# Patient Record
Sex: FEMALE | Race: OTHER | Employment: FULL TIME | URBAN - METROPOLITAN AREA
[De-identification: names, ages, dates, MRNs, and addresses within clinical notes are randomized per-mention and may not be internally consistent; named-entity substitution may affect disease eponyms.]

---

## 2018-08-04 ENCOUNTER — OFFICE VISIT (OUTPATIENT)
Dept: FAMILY MEDICINE CLINIC | Facility: CLINIC | Age: 38
End: 2018-08-04
Payer: COMMERCIAL

## 2018-08-04 VITALS
TEMPERATURE: 97 F | HEART RATE: 80 BPM | DIASTOLIC BLOOD PRESSURE: 86 MMHG | SYSTOLIC BLOOD PRESSURE: 138 MMHG | BODY MASS INDEX: 48.52 KG/M2 | WEIGHT: 257 LBS | HEIGHT: 60.96 IN

## 2018-08-04 DIAGNOSIS — M54.50 ACUTE BILATERAL LOW BACK PAIN WITHOUT SCIATICA: ICD-10-CM

## 2018-08-04 DIAGNOSIS — J30.9 ALLERGIC RHINITIS, UNSPECIFIED SEASONALITY, UNSPECIFIED TRIGGER: ICD-10-CM

## 2018-08-04 DIAGNOSIS — G44.209 ACUTE NON INTRACTABLE TENSION-TYPE HEADACHE: Primary | ICD-10-CM

## 2018-08-04 LAB
APPEARANCE: CLEAR
BILIRUBIN: NEGATIVE
GLUCOSE (URINE DIPSTICK): NEGATIVE MG/DL
MULTISTIX LOT#: NORMAL NUMERIC
NITRITE, URINE: NEGATIVE
OCCULT BLOOD: NEGATIVE
PH, URINE: 5.5 (ref 4.5–8)
PROTEIN (URINE DIPSTICK): NEGATIVE MG/DL
SPECIFIC GRAVITY: >=1.03 (ref 1–1.03)
URINE-COLOR: YELLOW
UROBILINOGEN,SEMI-QN: 0.2 MG/DL (ref 0–1.9)

## 2018-08-04 PROCEDURE — 99204 OFFICE O/P NEW MOD 45 MIN: CPT | Performed by: FAMILY MEDICINE

## 2018-08-04 PROCEDURE — 99212 OFFICE O/P EST SF 10 MIN: CPT | Performed by: FAMILY MEDICINE

## 2018-08-04 PROCEDURE — 81003 URINALYSIS AUTO W/O SCOPE: CPT | Performed by: FAMILY MEDICINE

## 2018-08-04 RX ORDER — KETOROLAC TROMETHAMINE 10 MG/1
10 TABLET, FILM COATED ORAL EVERY 12 HOURS PRN
Qty: 10 TABLET | Refills: 1 | Status: SHIPPED | OUTPATIENT
Start: 2018-08-04 | End: 2018-11-08

## 2018-08-04 RX ORDER — FLUTICASONE PROPIONATE 50 MCG
2 SPRAY, SUSPENSION (ML) NASAL DAILY
Qty: 1 INHALER | Refills: 3 | Status: SHIPPED | OUTPATIENT
Start: 2018-08-04

## 2018-08-04 RX ORDER — METAXALONE 800 MG/1
800 TABLET ORAL 3 TIMES DAILY
Qty: 30 TABLET | Refills: 1 | Status: SHIPPED | OUTPATIENT
Start: 2018-08-04 | End: 2018-08-24

## 2018-08-04 NOTE — PROGRESS NOTES
8/4/2018  1:01 PM    Sadaf Mccray is a 43year old female.     Chief complaint(s): Patient presents with:  Headache: X 1 month   Nose Problem: X 3 months   Low Back Pain: had it for a while     HPI:     Sadaf Mccray primary c Smoking status: Never Smoker                                                              Smokeless tobacco: Never Used                      Alcohol use: No                 Immunizations: There is no immunization history on file for this patient.     Me Abdominal: Soft. Normal appearance and bowel sounds are normal. There is no tenderness. There is no CVA tenderness. Musculoskeletal:   Negative for scoliosis   Lymphadenopathy:     She has no cervical adenopathy.    Neurological: No cranial nerve defici worsening of the medical condition. Also, inform the doctor with any new symptoms or medications' side effects.   Avoid caffeine, rest.    FOLLOW-UP: Schedule a follow-up visit in  prn.        2. Acute bilateral low back pain without sciatica  MEDICATIONS: effects. FOLLOW-UP: Schedule a follow-up visit in  prn.          Orders This Visit:    Orders Placed This Encounter      POC Urinalysis, Automated Dip without microscopy (PCA and EMMG ONLY) [47678]    Meds This Visit:    Signed Prescriptions Disp Refil

## 2018-09-21 ENCOUNTER — OFFICE VISIT (OUTPATIENT)
Dept: FAMILY MEDICINE CLINIC | Facility: CLINIC | Age: 38
End: 2018-09-21
Payer: COMMERCIAL

## 2018-09-21 ENCOUNTER — LAB ENCOUNTER (OUTPATIENT)
Dept: LAB | Age: 38
End: 2018-09-21
Attending: FAMILY MEDICINE
Payer: COMMERCIAL

## 2018-09-21 VITALS
HEIGHT: 68 IN | WEIGHT: 266 LBS | BODY MASS INDEX: 40.32 KG/M2 | TEMPERATURE: 98 F | SYSTOLIC BLOOD PRESSURE: 130 MMHG | DIASTOLIC BLOOD PRESSURE: 78 MMHG | HEART RATE: 71 BPM

## 2018-09-21 DIAGNOSIS — Z00.00 PHYSICAL EXAM: Primary | ICD-10-CM

## 2018-09-21 DIAGNOSIS — N64.59 INVERTED NIPPLE: ICD-10-CM

## 2018-09-21 DIAGNOSIS — H11.003 PTERYGIUM OF BOTH EYES: ICD-10-CM

## 2018-09-21 DIAGNOSIS — Z00.00 PHYSICAL EXAM: ICD-10-CM

## 2018-09-21 DIAGNOSIS — J01.40 SUBACUTE PANSINUSITIS: ICD-10-CM

## 2018-09-21 LAB
ALBUMIN SERPL BCP-MCNC: 3.5 G/DL (ref 3.5–4.8)
ALBUMIN/GLOB SERPL: 1.1 {RATIO} (ref 1–2)
ALP SERPL-CCNC: 67 U/L (ref 32–100)
ALT SERPL-CCNC: 15 U/L (ref 14–54)
ANION GAP SERPL CALC-SCNC: 6 MMOL/L (ref 0–18)
AST SERPL-CCNC: 18 U/L (ref 15–41)
BACTERIA UR QL AUTO: NEGATIVE /HPF
BACTERIA UR QL AUTO: NEGATIVE /HPF
BASOPHILS # BLD: 0 K/UL (ref 0–0.2)
BASOPHILS NFR BLD: 1 %
BILIRUB SERPL-MCNC: 0.8 MG/DL (ref 0.3–1.2)
BILIRUB UR QL: NEGATIVE
BUN SERPL-MCNC: 8 MG/DL (ref 8–20)
BUN/CREAT SERPL: 15.7 (ref 10–20)
CALCIUM SERPL-MCNC: 8.6 MG/DL (ref 8.5–10.5)
CHLORIDE SERPL-SCNC: 107 MMOL/L (ref 95–110)
CHOLEST SERPL-MCNC: 195 MG/DL (ref 110–200)
CO2 SERPL-SCNC: 23 MMOL/L (ref 22–32)
COLOR UR: YELLOW
CREAT SERPL-MCNC: 0.51 MG/DL (ref 0.5–1.5)
EOSINOPHIL # BLD: 0.1 K/UL (ref 0–0.7)
EOSINOPHIL NFR BLD: 1 %
ERYTHROCYTE [DISTWIDTH] IN BLOOD BY AUTOMATED COUNT: 13.4 % (ref 11–15)
GLOBULIN PLAS-MCNC: 3.2 G/DL (ref 2.5–3.7)
GLUCOSE SERPL-MCNC: 89 MG/DL (ref 70–99)
GLUCOSE UR-MCNC: NEGATIVE MG/DL
HBA1C MFR BLD: 5.1 % (ref 4–6)
HCT VFR BLD AUTO: 39.9 % (ref 35–48)
HDLC SERPL-MCNC: 58 MG/DL
HGB BLD-MCNC: 13.4 G/DL (ref 12–16)
HGB UR QL STRIP.AUTO: NEGATIVE
KETONES UR-MCNC: NEGATIVE MG/DL
LDLC SERPL CALC-MCNC: 115 MG/DL (ref 0–99)
LYMPHOCYTES # BLD: 1.9 K/UL (ref 1–4)
LYMPHOCYTES NFR BLD: 22 %
MCH RBC QN AUTO: 28.6 PG (ref 27–32)
MCHC RBC AUTO-ENTMCNC: 33.7 G/DL (ref 32–37)
MCV RBC AUTO: 84.9 FL (ref 80–100)
MONOCYTES # BLD: 0.5 K/UL (ref 0–1)
MONOCYTES NFR BLD: 6 %
NEUTROPHILS # BLD AUTO: 6 K/UL (ref 1.8–7.7)
NEUTROPHILS NFR BLD: 70 %
NITRITE UR QL STRIP.AUTO: NEGATIVE
NONHDLC SERPL-MCNC: 137 MG/DL
OSMOLALITY UR CALC.SUM OF ELEC: 280 MOSM/KG (ref 275–295)
PATIENT FASTING: YES
PH UR: 5 [PH] (ref 5–8)
PLATELET # BLD AUTO: 358 K/UL (ref 140–400)
PMV BLD AUTO: 8.1 FL (ref 7.4–10.3)
POTASSIUM SERPL-SCNC: 3.7 MMOL/L (ref 3.3–5.1)
PROT SERPL-MCNC: 6.7 G/DL (ref 5.9–8.4)
PROT UR-MCNC: 30 MG/DL
RBC # BLD AUTO: 4.7 M/UL (ref 3.7–5.4)
RBC #/AREA URNS AUTO: <1 /HPF
RBC #/AREA URNS AUTO: <1 /HPF
SODIUM SERPL-SCNC: 136 MMOL/L (ref 136–144)
SP GR UR STRIP: 1.03 (ref 1–1.03)
TRIGL SERPL-MCNC: 111 MG/DL (ref 1–149)
TSH SERPL-ACNC: 2.87 UIU/ML (ref 0.45–5.33)
UROBILINOGEN UR STRIP-ACNC: <2
VIT C UR-MCNC: NEGATIVE MG/DL
WBC # BLD AUTO: 8.6 K/UL (ref 4–11)
WBC #/AREA URNS AUTO: 5 /HPF
WBC #/AREA URNS AUTO: 5 /HPF

## 2018-09-21 PROCEDURE — 93005 ELECTROCARDIOGRAM TRACING: CPT

## 2018-09-21 PROCEDURE — 85025 COMPLETE CBC W/AUTO DIFF WBC: CPT

## 2018-09-21 PROCEDURE — 83036 HEMOGLOBIN GLYCOSYLATED A1C: CPT

## 2018-09-21 PROCEDURE — 90472 IMMUNIZATION ADMIN EACH ADD: CPT | Performed by: FAMILY MEDICINE

## 2018-09-21 PROCEDURE — 90686 IIV4 VACC NO PRSV 0.5 ML IM: CPT | Performed by: FAMILY MEDICINE

## 2018-09-21 PROCEDURE — 81015 MICROSCOPIC EXAM OF URINE: CPT | Performed by: FAMILY MEDICINE

## 2018-09-21 PROCEDURE — 81001 URINALYSIS AUTO W/SCOPE: CPT | Performed by: FAMILY MEDICINE

## 2018-09-21 PROCEDURE — 93010 ELECTROCARDIOGRAM REPORT: CPT | Performed by: FAMILY MEDICINE

## 2018-09-21 PROCEDURE — 80053 COMPREHEN METABOLIC PANEL: CPT

## 2018-09-21 PROCEDURE — 99395 PREV VISIT EST AGE 18-39: CPT | Performed by: FAMILY MEDICINE

## 2018-09-21 PROCEDURE — 90715 TDAP VACCINE 7 YRS/> IM: CPT | Performed by: FAMILY MEDICINE

## 2018-09-21 PROCEDURE — 90471 IMMUNIZATION ADMIN: CPT | Performed by: FAMILY MEDICINE

## 2018-09-21 PROCEDURE — 36415 COLL VENOUS BLD VENIPUNCTURE: CPT

## 2018-09-21 PROCEDURE — 82306 VITAMIN D 25 HYDROXY: CPT | Performed by: FAMILY MEDICINE

## 2018-09-21 PROCEDURE — 80061 LIPID PANEL: CPT

## 2018-09-21 PROCEDURE — 84443 ASSAY THYROID STIM HORMONE: CPT

## 2018-09-21 RX ORDER — AMOXICILLIN 500 MG/1
500 CAPSULE ORAL 2 TIMES DAILY
Qty: 20 CAPSULE | Refills: 0 | Status: SHIPPED | OUTPATIENT
Start: 2018-09-21 | End: 2018-10-01

## 2018-09-21 RX ORDER — MOMETASONE 50 UG/1
1 SPRAY, METERED NASAL DAILY
Qty: 1 INHALER | Refills: 3 | Status: SHIPPED | OUTPATIENT
Start: 2018-09-21

## 2018-09-21 RX ORDER — FEXOFENADINE HCL 180 MG/1
180 TABLET ORAL DAILY
Qty: 90 TABLET | Refills: 1 | Status: SHIPPED | OUTPATIENT
Start: 2018-09-21 | End: 2019-02-05 | Stop reason: ALTCHOICE

## 2018-09-21 NOTE — PROGRESS NOTES
9/21/2018  7:56 AM    Ellie Brown is a 43year old female.     Chief complaint(s): Patient presents with:  Physical: Annual Px, Patient c/o headaches for past 6 months off/on    HPI:     Ellie Brown primary complaint is reg MG Oral Tab Take 1 tablet (180 mg total) by mouth daily. Disp: 90 tablet Rfl: 1   Ketorolac Tromethamine 10 MG Oral Tab Take 1 tablet (10 mg total) by mouth every 12 (twelve) hours as needed for Pain.  Disp: 10 tablet Rfl: 1   Fluticasone Propionate 50 MCG/ bilaterally. Normal nasal septum, throat clear without lesions or exudate and normal tonsils.  +  Sinus tenderness    Eyes:   + pterygium bilateral   Neck: Neck supple. No JVD present. No thyromegaly present.    Cardiovascular: Normal rate, regular rhythm a (52646)     REFERRALS: generated today : TETANUS, DIPHTHERIA TOXOIDS AND ACELLULAR PERTUSIS VACCINE (TDAP), >7 YEARS, IM USE  FLULAVAL INFLUENZA VACCINE QUAD PRESERVATIVE FREE 0.5 ML  OPHTHALMOLOGY - INTERNAL  US BREAST LEFT COMPLETE (OAJ=61516) .     IMMUN dose PF (40421)      Meds This Visit:  Requested Prescriptions     Signed Prescriptions Disp Refills   • amoxicillin 500 MG Oral Cap 20 capsule 0     Sig: Take 1 capsule (500 mg total) by mouth 2 (two) times daily for 10 days.    • Mometasone Furoate 50 MCG

## 2018-09-24 LAB — 25(OH)D3 SERPL-MCNC: 30.9 NG/ML (ref 30–100)

## 2018-09-24 RX ORDER — ERGOCALCIFEROL 1.25 MG/1
50000 CAPSULE ORAL WEEKLY
Qty: 12 CAPSULE | Refills: 4 | Status: SHIPPED | OUTPATIENT
Start: 2018-09-24 | End: 2018-10-24

## 2018-09-25 ENCOUNTER — TELEPHONE (OUTPATIENT)
Dept: FAMILY MEDICINE CLINIC | Facility: CLINIC | Age: 38
End: 2018-09-25

## 2018-09-25 NOTE — TELEPHONE ENCOUNTER
----- Message from Laura Levine MD sent at 9/24/2018  8:00 PM CDT -----  Please notify patient that his/her blood test showed low levels of vitamin D. A prescription was sent to his pharmacy to take one capsule or tablet, once a week. This Rx is good for one year. We'll recheck levels in one year.

## 2018-09-27 ENCOUNTER — TELEPHONE (OUTPATIENT)
Dept: FAMILY MEDICINE CLINIC | Facility: CLINIC | Age: 38
End: 2018-09-27

## 2018-09-27 DIAGNOSIS — R92.8 ABNORMAL MAMMOGRAM: Primary | ICD-10-CM

## 2018-09-27 NOTE — TELEPHONE ENCOUNTER
Central Scheduling called in stating they were attempting to schedule Pt for us US of the breast, but the radiology department stated that she's never been seen for her mammogram and they would need an order for a diagnostic bilateral mammogram before she

## 2018-10-11 NOTE — TELEPHONE ENCOUNTER
Patient was unable to have test done since orders were incorrect in system. Patient requesting if orders can be corrected so she can have her test done. Per scheduling patients order should be for a diagnostic bilateral mammogram/ultrasound.  Please advise

## 2018-11-07 ENCOUNTER — NURSE TRIAGE (OUTPATIENT)
Dept: OTHER | Age: 38
End: 2018-11-07

## 2018-11-07 NOTE — TELEPHONE ENCOUNTER
Action Requested: Summary for Provider     []  Critical Lab, Recommendations Needed  [] Need Additional Advice  []   FYI    []   Need Orders  [] Need Medications Sent to Pharmacy  []  Other     SUMMARY:ER f/u 11/8 Pt is at work and calling as she does not

## 2018-11-08 NOTE — PROGRESS NOTES
11/8/2018  10:06 AM    Marialuisa Fletcher is a 43year old female. Chief complaint(s): Patient presents with:  ER F/U: ER follow up for HTN    HPI:     Marialuisa Fletcher primary complaint is regarding multiple issues.      Patient is Oral Tab Take 1 tablet (180 mg total) by mouth daily. Disp: 90 tablet Rfl: 1   Fluticasone Propionate 50 MCG/ACT Nasal Suspension 2 sprays by Each Nare route daily.  Disp: 1 Inhaler Rfl: 3   ALPRAZolam 0.25 MG Oral Tab  Disp:  Rfl:        Allergies:  No Kno METABOLIC PANEL (14)   Result Value Ref Range    Glucose 89 70 - 99 mg/dL    Sodium 136 136 - 144 mmol/L    Potassium 3.7 3.3 - 5.1 mmol/L    Chloride 107 95 - 110 mmol/L    CO2 23 22 - 32 mmol/L    BUN 8 8 - 20 mg/dL    Creatinine 0.51 0.50 - 1.50 mg/dL 80.0 - 100.0 fL    MCH 28.6 27.0 - 32.0 pg    MCHC 33.7 32.0 - 37.0 g/dl    RDW 13.4 11.0 - 15.0 %     140 - 400 K/UL    MPV 8.1 7.4 - 10.3 fL    Neutrophil % 70 %    Lymphocyte % 22 %    Monocyte % 6 %    Eosinophil % 1 %    Basophil % 1 %    Neutr 40.0 to 44.9 in adult [facility])    RECOMMENDATIONS given include: Please, call our office with any questions or concerns. Notify Dr Sandra Burns or the [facility] if there is a development of any new medical condition.  Stop medication immediatley if she belie

## 2019-02-05 ENCOUNTER — OFFICE VISIT (OUTPATIENT)
Dept: FAMILY MEDICINE CLINIC | Facility: CLINIC | Age: 39
End: 2019-02-05
Payer: COMMERCIAL

## 2019-02-05 VITALS
DIASTOLIC BLOOD PRESSURE: 89 MMHG | HEIGHT: 68 IN | WEIGHT: 279 LBS | TEMPERATURE: 98 F | SYSTOLIC BLOOD PRESSURE: 127 MMHG | HEART RATE: 75 BPM | BODY MASS INDEX: 42.28 KG/M2

## 2019-02-05 DIAGNOSIS — R10.2 PELVIC PAIN: Primary | ICD-10-CM

## 2019-02-05 DIAGNOSIS — J30.89 NON-SEASONAL ALLERGIC RHINITIS, UNSPECIFIED TRIGGER: ICD-10-CM

## 2019-02-05 PROCEDURE — 99214 OFFICE O/P EST MOD 30 MIN: CPT | Performed by: FAMILY MEDICINE

## 2019-02-05 PROCEDURE — 96372 THER/PROPH/DIAG INJ SC/IM: CPT | Performed by: FAMILY MEDICINE

## 2019-02-05 PROCEDURE — 99212 OFFICE O/P EST SF 10 MIN: CPT | Performed by: FAMILY MEDICINE

## 2019-02-05 RX ORDER — DESLORATADINE 5 MG/1
5 TABLET ORAL DAILY
Qty: 90 TABLET | Refills: 3 | Status: SHIPPED | OUTPATIENT
Start: 2019-02-05 | End: 2019-08-29

## 2019-02-05 RX ORDER — FLUTICASONE PROPIONATE 50 MCG
2 SPRAY, SUSPENSION (ML) NASAL DAILY
Qty: 1 INHALER | Refills: 3 | Status: SHIPPED | OUTPATIENT
Start: 2019-02-05 | End: 2020-03-26

## 2019-02-05 RX ORDER — METHYLPREDNISOLONE ACETATE 80 MG/ML
80 INJECTION, SUSPENSION INTRA-ARTICULAR; INTRALESIONAL; INTRAMUSCULAR; SOFT TISSUE ONCE
Status: COMPLETED | OUTPATIENT
Start: 2019-02-05 | End: 2019-02-05

## 2019-02-05 RX ADMIN — METHYLPREDNISOLONE ACETATE 80 MG: 80 INJECTION, SUSPENSION INTRA-ARTICULAR; INTRALESIONAL; INTRAMUSCULAR; SOFT TISSUE at 14:00:00

## 2019-02-05 NOTE — PROGRESS NOTES
2/5/2019  1:44 PM    Marin Gitelman is a 43year old female. Chief complaint(s): Patient presents with: Allergies    HPI:     Marin Gitelman primary complaint is regarding allergies.      Marin Gitelman is here tablet (5 mg total) by mouth daily. Disp: 90 tablet Rfl: 3   Fluticasone Propionate 50 MCG/ACT Nasal Suspension 2 sprays by Each Nare route daily.  Disp: 1 Inhaler Rfl: 3   ALPRAZolam (XANAX) 0.25 MG Oral Tab Take 1 tablet (0.25 mg total) by mouth every 12 rales.   Abdominal: Soft. Normal appearance and bowel sounds are normal. There is tenderness in the left lower quadrant. Lymphadenopathy:     She has no cervical adenopathy. Skin: No rash noted.        LABORATORY RESULTS:     EKG / Spirometry : -     Ra mouth daily. • Fluticasone Propionate 50 MCG/ACT Nasal Suspension 1 Inhaler 3     Si sprays by Each Nare route daily.        Imaging & Referrals:  CT ABDOMEN + PELVIS (W+WO) (CPT=74178)         Nicolle Huitron MD

## 2019-02-08 ENCOUNTER — TELEPHONE (OUTPATIENT)
Dept: CASE MANAGEMENT | Age: 39
End: 2019-02-08

## 2019-02-08 NOTE — TELEPHONE ENCOUNTER
Hi Dr. Aparna Chaparro,    The CT scan you ordered for Mirna Yadav has been denied by her insurance company. Clinical information did not meet the criteria for medical necessity. I LM with the patient advising her to f/u with you for future plan of care.     Thank you  Xiomara rojo

## 2019-02-09 NOTE — TELEPHONE ENCOUNTER
DR Chin=fyi! With [employer] #[id number], advised Dr Thanh Mendez note and verbalized understanding. States pain free now. Note      If pain is still there go to the ER.

## 2019-02-22 ENCOUNTER — HOSPITAL ENCOUNTER (OUTPATIENT)
Dept: CT IMAGING | Age: 39
Discharge: HOME | End: 2019-02-22
Attending: FAMILY MEDICINE
Payer: COMMERCIAL

## 2019-02-22 DIAGNOSIS — R10.2 PELVIC PAIN: ICD-10-CM

## 2019-08-29 ENCOUNTER — OFFICE VISIT (OUTPATIENT)
Dept: FAMILY MEDICINE CLINIC | Facility: CLINIC | Age: 39
End: 2019-08-29
Payer: COMMERCIAL

## 2019-08-29 ENCOUNTER — HOSPITAL ENCOUNTER (OUTPATIENT)
Dept: GENERAL RADIOLOGY | Age: 39
Discharge: HOME | End: 2019-08-29
Attending: FAMILY MEDICINE
Payer: COMMERCIAL

## 2019-08-29 VITALS
BODY MASS INDEX: 42.33 KG/M2 | WEIGHT: 279.31 LBS | DIASTOLIC BLOOD PRESSURE: 80 MMHG | HEART RATE: 92 BPM | HEIGHT: 68 IN | TEMPERATURE: 98 F | SYSTOLIC BLOOD PRESSURE: 132 MMHG

## 2019-08-29 DIAGNOSIS — M25.561 ACUTE PAIN OF RIGHT KNEE: ICD-10-CM

## 2019-08-29 DIAGNOSIS — J30.9 ALLERGIC RHINITIS, UNSPECIFIED SEASONALITY, UNSPECIFIED TRIGGER: Primary | ICD-10-CM

## 2019-08-29 PROCEDURE — 73562 X-RAY EXAM OF KNEE 3: CPT | Performed by: FAMILY MEDICINE

## 2019-08-29 PROCEDURE — 99214 OFFICE O/P EST MOD 30 MIN: CPT | Performed by: FAMILY MEDICINE

## 2019-08-29 RX ORDER — NAPROXEN 500 MG/1
500 TABLET ORAL 2 TIMES DAILY WITH MEALS
Qty: 45 TABLET | Refills: 1 | Status: SHIPPED | OUTPATIENT
Start: 2019-08-29

## 2019-08-29 RX ORDER — IPRATROPIUM BROMIDE 42 UG/1
2 SPRAY, METERED NASAL 4 TIMES DAILY
Qty: 15 ML | Refills: 2 | Status: SHIPPED | OUTPATIENT
Start: 2019-08-29

## 2019-08-29 RX ORDER — FEXOFENADINE HYDROCHLORIDE 60 MG/1
60 TABLET, FILM COATED ORAL DAILY
Qty: 90 TABLET | Refills: 0 | Status: SHIPPED | OUTPATIENT
Start: 2019-08-29 | End: 2019-11-27

## 2019-08-29 NOTE — PROGRESS NOTES
HPI:   Joana Merrill is a 35year old female who presents to clinic with complaints of acute right knee pain for the past 8 days. Pain seems to be worse in the posterior knee on the medial side.   The pain becomes sharp when she stands from murmur  MUSCULOSKELETAL: back is not tender, FROM of the back  EXTREMITIES: + R knee able to fully flex and extend at the knee, no gross swelling or deformity of the knee. Mildly tender to palpation of posterior knee on medial side, no mass palpated.  ++ K

## 2019-08-30 ENCOUNTER — TELEPHONE (OUTPATIENT)
Dept: OTHER | Age: 39
End: 2019-08-30

## 2019-08-30 NOTE — PROGRESS NOTES
Patient calling back (with [employer] #[phone]), advised Dr Terri Winn note and verbalized understanding. Central scheduling number given [phone]. Notes recorded by Glo Charles MD on 8/29/2019 at 6:36 PM CDT  Results reviewed.  Please inform maia

## 2019-09-03 ENCOUNTER — TELEPHONE (OUTPATIENT)
Dept: FAMILY MEDICINE CLINIC | Facility: CLINIC | Age: 39
End: 2019-09-03

## 2019-09-09 ENCOUNTER — TELEPHONE (OUTPATIENT)
Dept: CASE MANAGEMENT | Age: 39
End: 2019-09-09

## 2019-09-09 NOTE — TELEPHONE ENCOUNTER
Dr. Preston Monday,    I am working on the referral you submitted for an MRI for Michael Villarreal. This has not been authorized by her insurance company. Patient has been notified and advised to f/u with you for her future plan of care.     Dulce Lutz

## 2020-03-26 RX ORDER — FLUTICASONE PROPIONATE 50 MCG
SPRAY, SUSPENSION (ML) NASAL
Qty: 16 G | Refills: 0 | Status: SHIPPED | OUTPATIENT
Start: 2020-03-26